# Patient Record
Sex: MALE | NOT HISPANIC OR LATINO | URBAN - METROPOLITAN AREA
[De-identification: names, ages, dates, MRNs, and addresses within clinical notes are randomized per-mention and may not be internally consistent; named-entity substitution may affect disease eponyms.]

---

## 2018-03-28 ENCOUNTER — OFFICE VISIT - HEALTHEAST (OUTPATIENT)
Dept: INTERNAL MEDICINE | Facility: CLINIC | Age: 38
End: 2018-03-28

## 2018-03-28 DIAGNOSIS — R00.2 PALPITATIONS: ICD-10-CM

## 2018-03-28 DIAGNOSIS — R14.3 FLATULENCE: ICD-10-CM

## 2018-03-28 DIAGNOSIS — Z00.00 HEALTH MAINTENANCE EXAMINATION: ICD-10-CM

## 2018-03-28 DIAGNOSIS — K21.9 GASTROESOPHAGEAL REFLUX DISEASE WITHOUT ESOPHAGITIS: ICD-10-CM

## 2018-03-28 DIAGNOSIS — D69.6 THROMBOCYTOPENIA (H): ICD-10-CM

## 2018-03-28 LAB
ALBUMIN SERPL-MCNC: 3.8 G/DL (ref 3.5–5)
ALP SERPL-CCNC: 56 U/L (ref 45–120)
ALT SERPL W P-5'-P-CCNC: 60 U/L (ref 0–45)
ANION GAP SERPL CALCULATED.3IONS-SCNC: 11 MMOL/L (ref 5–18)
AST SERPL W P-5'-P-CCNC: 35 U/L (ref 0–40)
BILIRUB SERPL-MCNC: 0.3 MG/DL (ref 0–1)
BUN SERPL-MCNC: 16 MG/DL (ref 8–22)
CALCIUM SERPL-MCNC: 9.3 MG/DL (ref 8.5–10.5)
CHLORIDE BLD-SCNC: 102 MMOL/L (ref 98–107)
CHOLEST SERPL-MCNC: 157 MG/DL
CO2 SERPL-SCNC: 27 MMOL/L (ref 22–31)
CREAT SERPL-MCNC: 1.08 MG/DL (ref 0.7–1.3)
ERYTHROCYTE [DISTWIDTH] IN BLOOD BY AUTOMATED COUNT: 13.4 % (ref 11–14.5)
FASTING STATUS PATIENT QL REPORTED: NO
GFR SERPL CREATININE-BSD FRML MDRD: >60 ML/MIN/1.73M2
GLUCOSE BLD-MCNC: 81 MG/DL (ref 70–125)
HCT VFR BLD AUTO: 46.7 % (ref 40–54)
HDLC SERPL-MCNC: 46 MG/DL
HGB BLD-MCNC: 15.2 G/DL (ref 14–18)
HIV 1+2 AB+HIV1 P24 AG SERPL QL IA: NEGATIVE
LDLC SERPL CALC-MCNC: 85 MG/DL
MCH RBC QN AUTO: 23.6 PG (ref 27–34)
MCHC RBC AUTO-ENTMCNC: 32.5 G/DL (ref 32–36)
MCV RBC AUTO: 73 FL (ref 80–100)
PLATELET # BLD AUTO: 118 THOU/UL (ref 140–440)
PMV BLD AUTO: 10 FL (ref 7–10)
POTASSIUM BLD-SCNC: 4.1 MMOL/L (ref 3.5–5)
PROT SERPL-MCNC: 7 G/DL (ref 6–8)
RBC # BLD AUTO: 6.44 MILL/UL (ref 4.4–6.2)
SODIUM SERPL-SCNC: 140 MMOL/L (ref 136–145)
TRIGL SERPL-MCNC: 128 MG/DL
WBC: 5 THOU/UL (ref 4–11)

## 2018-03-28 RX ORDER — ACETAMINOPHEN 325 MG/1
650 TABLET ORAL EVERY 6 HOURS PRN
Status: SHIPPED | COMMUNITY
Start: 2018-03-28

## 2018-03-28 ASSESSMENT — MIFFLIN-ST. JEOR: SCORE: 1661.77

## 2018-03-29 LAB
C TRACH DNA SPEC QL PROBE+SIG AMP: NEGATIVE
N GONORRHOEA DNA SPEC QL NAA+PROBE: NEGATIVE
T PALLIDUM AB SER QL: NEGATIVE

## 2018-06-14 ENCOUNTER — COMMUNICATION - HEALTHEAST (OUTPATIENT)
Dept: INTERNAL MEDICINE | Facility: CLINIC | Age: 38
End: 2018-06-14

## 2018-12-12 ENCOUNTER — COMMUNICATION - HEALTHEAST (OUTPATIENT)
Dept: INTERNAL MEDICINE | Facility: CLINIC | Age: 38
End: 2018-12-12

## 2018-12-12 DIAGNOSIS — Z00.00 ROUTINE HEALTH MAINTENANCE: ICD-10-CM

## 2019-02-25 ENCOUNTER — COMMUNICATION - HEALTHEAST (OUTPATIENT)
Dept: INTERNAL MEDICINE | Facility: CLINIC | Age: 39
End: 2019-02-25

## 2019-06-24 ENCOUNTER — OFFICE VISIT - HEALTHEAST (OUTPATIENT)
Dept: INTERNAL MEDICINE | Facility: CLINIC | Age: 39
End: 2019-06-24

## 2019-06-24 DIAGNOSIS — R20.8 BURNING SENSATION OF FOOT: ICD-10-CM

## 2019-06-24 DIAGNOSIS — R10.9 RIGHT LATERAL ABDOMINAL PAIN: ICD-10-CM

## 2019-06-24 DIAGNOSIS — N48.89 PENIS PAIN: ICD-10-CM

## 2019-06-24 LAB
ALBUMIN SERPL-MCNC: 4.3 G/DL (ref 3.5–5)
ALBUMIN UR-MCNC: NEGATIVE MG/DL
ALP SERPL-CCNC: 54 U/L (ref 45–120)
ALT SERPL W P-5'-P-CCNC: 36 U/L (ref 0–45)
ANION GAP SERPL CALCULATED.3IONS-SCNC: 7 MMOL/L (ref 5–18)
APPEARANCE UR: CLEAR
AST SERPL W P-5'-P-CCNC: 23 U/L (ref 0–40)
BILIRUB SERPL-MCNC: 0.4 MG/DL (ref 0–1)
BILIRUB UR QL STRIP: NEGATIVE
BUN SERPL-MCNC: 14 MG/DL (ref 8–22)
CALCIUM SERPL-MCNC: 10 MG/DL (ref 8.5–10.5)
CHLORIDE BLD-SCNC: 104 MMOL/L (ref 98–107)
CO2 SERPL-SCNC: 28 MMOL/L (ref 22–31)
COLOR UR AUTO: YELLOW
CREAT SERPL-MCNC: 0.96 MG/DL (ref 0.7–1.3)
ERYTHROCYTE [DISTWIDTH] IN BLOOD BY AUTOMATED COUNT: 12.3 % (ref 11–14.5)
GFR SERPL CREATININE-BSD FRML MDRD: >60 ML/MIN/1.73M2
GLUCOSE BLD-MCNC: 104 MG/DL (ref 70–125)
GLUCOSE UR STRIP-MCNC: NEGATIVE MG/DL
HCT VFR BLD AUTO: 49 % (ref 40–54)
HGB BLD-MCNC: 15.7 G/DL (ref 14–18)
HGB UR QL STRIP: NEGATIVE
KETONES UR STRIP-MCNC: NEGATIVE MG/DL
LEUKOCYTE ESTERASE UR QL STRIP: NEGATIVE
MCH RBC QN AUTO: 24 PG (ref 27–34)
MCHC RBC AUTO-ENTMCNC: 32 G/DL (ref 32–36)
MCV RBC AUTO: 75 FL (ref 80–100)
NITRATE UR QL: NEGATIVE
PH UR STRIP: 7 [PH] (ref 5–8)
PLATELET # BLD AUTO: 124 THOU/UL (ref 140–440)
PMV BLD AUTO: 9.3 FL (ref 7–10)
POTASSIUM BLD-SCNC: 4.3 MMOL/L (ref 3.5–5)
PROT SERPL-MCNC: 7.3 G/DL (ref 6–8)
RBC # BLD AUTO: 6.54 MILL/UL (ref 4.4–6.2)
SODIUM SERPL-SCNC: 139 MMOL/L (ref 136–145)
SP GR UR STRIP: 1.02 (ref 1–1.03)
TSH SERPL DL<=0.005 MIU/L-ACNC: 1.01 UIU/ML (ref 0.3–5)
UROBILINOGEN UR STRIP-ACNC: NORMAL
WBC: 4.3 THOU/UL (ref 4–11)

## 2019-06-24 ASSESSMENT — MIFFLIN-ST. JEOR: SCORE: 1668.58

## 2019-06-25 LAB
C TRACH DNA SPEC QL PROBE+SIG AMP: NEGATIVE
N GONORRHOEA DNA SPEC QL NAA+PROBE: NEGATIVE

## 2019-07-01 ENCOUNTER — HOSPITAL ENCOUNTER (OUTPATIENT)
Dept: CT IMAGING | Facility: CLINIC | Age: 39
Discharge: HOME | End: 2019-07-01

## 2019-07-01 DIAGNOSIS — R10.9 RIGHT LATERAL ABDOMINAL PAIN: ICD-10-CM

## 2020-03-03 ENCOUNTER — AMBULATORY - HEALTHEAST (OUTPATIENT)
Dept: NURSING | Facility: CLINIC | Age: 40
End: 2020-03-03

## 2020-03-20 ENCOUNTER — COMMUNICATION - HEALTHEAST (OUTPATIENT)
Dept: INTERNAL MEDICINE | Facility: CLINIC | Age: 40
End: 2020-03-20

## 2020-03-20 DIAGNOSIS — R52 PAIN: ICD-10-CM

## 2020-03-21 RX ORDER — IBUPROFEN 600 MG/1
TABLET, FILM COATED ORAL
Qty: 60 TABLET | Refills: 0 | Status: SHIPPED | OUTPATIENT
Start: 2020-03-21

## 2020-03-25 ENCOUNTER — COMMUNICATION - HEALTHEAST (OUTPATIENT)
Dept: INTERNAL MEDICINE | Facility: CLINIC | Age: 40
End: 2020-03-25

## 2020-03-25 ENCOUNTER — OFFICE VISIT - HEALTHEAST (OUTPATIENT)
Dept: INTERNAL MEDICINE | Facility: CLINIC | Age: 40
End: 2020-03-25

## 2020-03-25 DIAGNOSIS — K21.9 GASTROESOPHAGEAL REFLUX DISEASE WITHOUT ESOPHAGITIS: ICD-10-CM

## 2020-05-08 ENCOUNTER — VIRTUAL VISIT (OUTPATIENT)
Dept: FAMILY MEDICINE | Facility: OTHER | Age: 40
End: 2020-05-08

## 2020-05-08 NOTE — PROGRESS NOTES
"Date: 2020 07:31:09  Clinician: Suzy Koehler  Clinician NPI: [id number]  Patient: Howard Crocker  Patient : 1980  Patient Address:  [address], [place] [address]  Patient Phone: [phone]  Visit Protocol: URI  Patient Summary:  Howard is a 39 year old ( : 1980 ) male who initiated a Visit for COVID-19 (Coronavirus) evaluation and screening. When asked the question \"Please sign me up to receive news, health information and promotions. \", Howard responded \"No\".    Howard states his symptoms started gradually 3-4 days ago. After his symptoms started, they improved and then got worse again.   His symptoms consist of rhinitis, a sore throat, a cough, diarrhea, ear pain, and a headache. He is experiencing mild difficulty breathing with activities but can speak normally in full sentences.   Symptom details     Nasal secretions: The color of his mucus is clear.    Cough: Howard coughs a few times an hour and his cough is not more bothersome at night. Phlegm does not come into his throat when he coughs. He believes his cough is caused by post-nasal drip.     Sore throat: Howard reports having moderate throat pain (4-6 on a 10 point pain scale), does not have exudate on his tonsils, and can swallow liquids. The lymph nodes in his neck are not enlarged. A rash has not appeared on the skin since the sore throat started.     Headache: He states the headache is moderate (4-6 on a 10 point pain scale).      Howard denies having fever, myalgias, facial pain or pressure, nasal congestion, vomiting, nausea, teeth pain, ageusia, anosmia, malaise, wheezing, enlarged lymph nodes, and chills. He also denies taking antibiotic medication for the symptoms, having a sinus infection within the past year, and having recent facial or sinus surgery in the past 60 days.   Precipitating events  Howard is not sure if he has been exposed to someone with strep throat. He has recently been exposed to someone with influenza. " Howard has been in close contact with the following high risk individuals: pregnant women.   Pertinent COVID-19 (Coronavirus) information  Howard does not work or volunteer as healthcare worker or a  and does not work or volunteer in a healthcare facility.   He does not live with a healthcare worker.   Howard has not had a close contact with a laboratory-confirmed COVID-19 patient within 14 days of symptom onset. He also has not had a close contact with a suspected COVID-19 patient within 14 days of symptom onset.   Pertinent medical history  Howard does not need a return to work/school note.   Weight: 180 lbs   Howard does not smoke or use smokeless tobacco.   Additional information as reported by the patient (free text): Pain at the back of the neck. Feel like some pressure on the chest.   Weight: 180 lbs    MEDICATIONS: No current medications, ALLERGIES: NKDA  Clinician Response:  Dear Howard,      Dear Howard  Your symptoms show that you may have coronavirus (COVID-19). This illness can cause fever, cough and trouble breathing. Many people get a mild case and get better on their own. Some people can get very sick.  Will I be tested for COVID-19?  Because we have limited testing supplies we are not testing everyone if they are low risk. We are testing if:   You are very ill. For example, you're on chemotherapy, dialysis or home hospice care. (Contact your specialty clinic or program.)   You live in a nursing home or other long-term care facility. (Talk to your nurse manager or medical director.)   You're a health care worker. ([facility] employees Contact our employee health office for testing.)   We are performing limited curbside testing for healthcare/first responders and people with medical problems that put them at increased risk. It does not appear by the [facility] information you submitted that you meet any of these criteria. If there are medical problems that we did not know about, please repeat an  [facility] visit and let us know what medical conditions you have.   How can I protect others?  Without a test, we can't know for sure that you have COVID-19. For safety, it's very important to follow these rules.  First, stay home and away from others (self-isolate) until:   You've had no fever---and no medicine that reduces fever---for 3 full days (72 hours). And...    Your other symptoms have gotten better. For example, your cough or breathing has improved. And...   At least 10 days have passed since your symptoms started.   During this time:   Don't go to work, school or anywhere else.    Stay away from others in your home. No hugging, kissing or shaking hands.   Don't let anyone visit.   Cover your mouth and nose with a mask, tissue or wash cloth to avoid spreading germs.   Wash your hands and face often. Use soap and water.   How can I take care of myself?  1.Take Tylenol (acetaminophen) for fever or pain. If you have liver or kidney problems, ask your family doctor if it's okay to take Tylenol.   Adults can take either:    650 mg (two 325 mg pills) every 4 to 6 hours, or...   1,000 mg (two 500 mg pills) every 8 hours as needed.    Note: Don't take more than 3,000 mg in one day.  For children, check the Tylenol bottle for the right dose. The dose is based on the child's age or weight.   2.If you have other health problems (like cancer, heart failure, an organ transplant or severe kidney disease): Call your specialty clinic if you don't feel better in the next 2 days.  3.Know when to call 911: If your breathing is so bad that it keeps you from doing normal activities, call 911 or go to the emergency room. Tell them that you've been staying home and may have COVID-19.  4.Sign up for [employer]. We know it's scary to hear that you might have COVID-19. We want to track your symptoms to make sure you're okay over the next 2 weeks. Please look for an email from [employer]---this is a free, online program that we'll  use to keep in touch. To sign up, follow the link in the email. Learn more at [email or web address].  Where can I get more information?  To learn more about COVID-19 and how to care for yourself at home, please visit the CDC website at https://www.cdc.gov/coronavirus/2019-ncov/about/steps-when-sick.html.  For more options for care at [facility], please visit our website at [email or web address]   If you are interested in becoming part of a N clinic trial related to COVID19 please go to [email or web address] for information, if you qualify.     Diagnosis: Chronic pharyngitis  Diagnosis ICD: J31.2

## 2021-05-29 NOTE — PROGRESS NOTES
[facility] Clinic Note    Patient Name: Howard Crocker  Patient Age: 39 y.o.  YOB: 1980  MRN: [id number]    Date of visit: 6/24/2019    Assessment/Plan:  No results found for this or any previous visit (from the past 24 hour(s)).  No medications were ordered this encounter      ICD-10-CM    1. Right lateral abdominal pain R10.9 CT Abdomen Pelvis With Oral With IV Contrast   2. Penis pain N48.89 Comprehensive Metabolic Panel     HM2(CBC w/o Differential)     Chlamydia trachomatis & Neisseria gonorrhoeae, Amplified Detection     Urinalysis-UC if Indicated   3. Burning sensation of foot R20.8 Thyroid Stimulating Hormone (TSH)       Uncertain cause of penile pain, will check a urinalysis and gonorrhea chlamydia, if still having this we will have him see a urologist.    Uncertain cause of heat sensation in feet, will check a thyroid, I did recommend that if it becomes painful or he starts having decreased sensation that he let us know I would look into doing an EMG or testing for neuropathy at that point.    Regarding the abdominal pain I am uncertain the cause it could be just muscular but pain was actually improved with flexion of the abdominal musculature therefore I will get a CT scan.    Patient Instructions   Go to ER if acute worsening.      Counseled patient regarding treatments, treatment options, risks and benefits and diagnosis.  The patient was interactive, attentive, verbalized understanding, and we discussed plan.       Patient Active Problem List   Diagnosis     GERD (gastroesophageal reflux disease)     Thrombocytopenia (H)     Flatulence     Palpitations     Social History     Social History Narrative     Not on file     Family History   Problem Relation Age of Onset     No Medical Problems Mother      No Medical Problems Father      Outpatient Encounter Medications as of 6/24/2019   Medication Sig Dispense Refill     acetaminophen (TYLENOL) 325 MG tablet Take 650 mg by mouth every 6 (six)  "hours as needed for pain.       ibuprofen (ADVIL,MOTRIN) 600 MG tablet TAKE ONE TABLET BY MOUTH THREE TIMES DAILY AS NEEDED FOR PAIN (TAKE WITH FOOD) 60 tablet 0     ranitidine (ZANTAC) 150 MG tablet Take 1 tablet (150 mg total) by mouth 2 (two) times a day as needed for heartburn. 60 tablet 1     No facility-administered encounter medications on file as of 6/24/2019.        Chief Complaint:   Chief Complaint   Patient presents with     Annual Exam     Foot Pain     bilateral     Flank Pain     right side       /72 (Patient Site: Left Arm, Patient Position: Sitting, Cuff Size: Adult Regular)   Pulse 70   Ht 5' 8\" (1.727 m)   Wt 175 lb 1 oz (79.4 kg)   SpO2 99%   BMI 26.62 kg/m    HPI:   Has had foot warm feeling - no burning or pain, entire foot feels that way, intermittent.  Better if he takes his shoes off.  Worse with eating sugar.  No numbness, no injuries    Right side of abdomen and back also has pain.  2 months.  No injury.  Intermittent.  No movement makes it worse.  Stools daily, no hematochezia.  No fever.      Has pain in his penis often.  Urinating more often now than previous.  3-4 lifetime sexual partners, monogamous wife.  Has had this pain intermittently x 3-4 years, more frequent lately.  Has pain postcoital.  No burning with urination.  No difficulty urinating.      ROS: Pertinent ros findings in hpi, all other systems negative.    Objective/Physical Exam:     /72 (Patient Site: Left Arm, Patient Position: Sitting, Cuff Size: Adult Regular)   Pulse 70   Ht 5' 8\" (1.727 m)   Wt 175 lb 1 oz (79.4 kg)   SpO2 99%   BMI 26.62 kg/m      Gen: NAD, appears age  Skin: warm, dry  HENT: normocephalic atraumatic, MMM  Eyes: non-icteric, no proptosis  CV: NRRR no m/r/g, no peripheral edema  Resp: CTAB no w/r/r, normal respiratory effort  Abd: non-distended, soft  Hematologic: No petechiae or purpura  MSK: no muscle or joint swelling  Neuro: no dysarthria or gross asymmetry  Psych: " Cooperative, full affect    tender to palpation:right lateral abdomen, better with abd muscle flexion  soft:Yes  distended:no  hernia palpated: no  hepato-splenomegaly:no  masses: no    Davila's positive: no    ecchymoses:no  flank tenderness: no  suprapubic tenderness:no  >3cm pulsating mass: no    Exam limited by body habitus: no     dp 2+ bilaterally  Sensory exam of the foot is normal, tested with the monofilament. Good pulses, no lesions or ulcers, good peripheral pulses.    Feet are nontender palpation, no swelling.  No excessive warmth on palpation.    Normal male external genitalia.  Prostate is also normal on exam.  Nontender.          Shon Braden MD

## 2021-06-01 VITALS — HEIGHT: 68 IN | BODY MASS INDEX: 26.31 KG/M2 | WEIGHT: 173.56 LBS

## 2021-06-03 VITALS — WEIGHT: 175.06 LBS | HEIGHT: 68 IN | BODY MASS INDEX: 26.53 KG/M2

## 2021-06-07 NOTE — TELEPHONE ENCOUNTER
RN cannot approve Refill Request    RN can NOT refill this medication med is not covered by policy/route to provider     . Last office visit: Visit date not found Last Physical: 3/28/2018 Last MTM visit: Visit date not found Last visit same specialty: Visit date not found.  Next visit within 3 mo: Visit date not found  Next physical within 3 mo: Visit date not found      Libertad Kendrick, [facility] Triage/Med Refill 3/20/2020    Requested Prescriptions   Pending Prescriptions Disp Refills     ibuprofen (ADVIL,MOTRIN) 600 MG tablet 60 tablet 0     Sig: TAKE ONE TABLET BY MOUTH THREE TIMES DAILY AS NEEDED FOR PAIN (TAKE WITH FOOD)       There is no refill protocol information for this order

## 2021-06-07 NOTE — PROGRESS NOTES
Howard Crocker is a 39 y.o. male who is being evaluated via a billable telephone visit.      Howard Crocker complains of    Chief Complaint   Patient presents with     Sore Throat     Chest Pain       Assessment/Plan:  1. Gastroesophageal reflux disease without esophagitis  Howard Crocker has a constellation of symptoms that match closely with GERD.  I recommended avoidance of ibuprofen, which he has been taking intermittently for headache, that he avoid eating right before sleep, avoid eating too much, and he should try omeprazole 20 mg daily for 4-week trial..  He will let me know in a couple weeks if is not working.  His wife has similar symptoms.  Initially I thought that this could be something that was passed from 1 person to another, but he also mentions she is pregnant, which could explain acid reflux.  I also considered strep throat, but he is afebrile, does not have any tender adenopathy, and he has not noticed any abnormality when inspecting his throat.  He does not notice any posterior nasal drainage, so less likely from that.        Additional provider notes:     Howard is a 39-year-old man who presents for telephone visit related to symptoms of sore throat.    He has been experiencing the 2nd week of sore throat that comes and goes, mild in severity.  Her wife is having the same symptoms (she is pregnant).    His mouth has a bitter taste in the monring, sometimes during the day at work    He sometimes has more classic heartburn symptoms.  He does have a history of heartburn and used to take ranitidine while it was still available.    He feels there is something stuck in the chest.  He is not short winded.  This is a very mild symptom, but somewhat annoying.    There is no cough.  1-2 months ago he had a cough    He uses ibuprofen for headache, which comes and goes.  I had just refilled a 600 mg tablet of ibuprofen for him, though he has not picked this up yet.    No fever.      No runny nose or posterior nasal  "drainage.      If something is hot or spicy his nose might run, but this is not the norm.    I have reviewed and updated the patient's Past Medical History, Social History, Family History, Allergies and Medication List.      The patient has been notified of following:     \"This telephone visit will be conducted via a call between you and your physician/provider. We have found that certain health care needs can be provided without the need for a physical exam.  This service lets us provide the care you need with a short phone conversation.  If a prescription is necessary we can send it directly to your pharmacy.  If lab work is needed we can place an order for that and you can then stop by our lab to have the test done at a later time.    If during the course of the call the physician/provider feels a telephone visit is not appropriate, you will not be charged for this service.\"       Phone call duration:  12 minutes    "

## 2021-06-07 NOTE — TELEPHONE ENCOUNTER
New Appointment Needed  What is the reason for the visit:    Sore throat, everything tastes bitter, feeling like something is pressing on chest, but breathing well and eating well.  Provider Preference: Any available  How soon do you need to be seen?: as soon as available  Waitlist offered?: No  Okay to leave a detailed message:  Yes

## 2021-06-07 NOTE — PATIENT INSTRUCTIONS - HE
The symptoms you presented (sore throat which seems to come and go), bitter taste in the throat, occasional heartburn, and the history of taking ibuprofen all seem to fit best with acid reflux or stomach acid as the cause of all this.    In general, I would recommend avoiding eating too much (full stomach can make symptoms worse), avoid eating just before you lie down for sleep, and would recommend taking omeprazole 20 mg daily.  It is best taken 30 to 60 minutes before your first meal.    Let me know if symptoms do not improve.  The omeprazole does not need to be a long-term medication, but I would recommend a 4-week trial.  That way we can tell if it will help or not.    You mentioned that your wife is pregnant and has similar symptoms.  I would not recommend that she takes omeprazole, as it is not regarded as one of the safer medications to take while pregnant.

## 2021-06-16 PROBLEM — R14.3 FLATULENCE: Status: ACTIVE | Noted: 2018-03-28

## 2021-06-16 PROBLEM — R00.2 PALPITATIONS: Status: ACTIVE | Noted: 2018-03-28

## 2021-06-16 PROBLEM — K21.9 GERD (GASTROESOPHAGEAL REFLUX DISEASE): Status: ACTIVE | Noted: 2018-03-28

## 2021-06-16 PROBLEM — D69.6 THROMBOCYTOPENIA (H): Status: ACTIVE | Noted: 2018-03-28

## 2021-06-17 NOTE — PROGRESS NOTES
[facility] Note    Howard Crocker   37 y.o. male    Date of Visit: 3/28/2018  Chief Complaint   Patient presents with     Annual Exam     Abdominal Pain     x6 months       ASSESSMENT/PLAN  1. Health maintenance examination  HM2(CBC w/o Differential)    Comprehensive Metabolic Panel    Lipid Cascade    HIV Antigen/Antibody Screening [place]    Syphilis Screen, Cascade    Chlamydia trachomatis & Neisseria gonorrhoeae, Amplified Detection   2. Gastroesophageal reflux disease without esophagitis  ranitidine (ZANTAC) 150 MG tablet   3. Thrombocytopenia  HM2(CBC w/o Differential)   4. Flatulence     5. Palpitations       ---------------------------------------------    1.  Check nonfasting labs as requested today.  He denies any tobacco or alcohol use or illicit drugs.  Overall, he seems in very good shape    2.  Rare episodes of GERD, recommended ranitidine, avoidance of spicy foods as this seems to be a trigger    3.  Noted history of thrombocytopenia, microcytosis, he also had workup for alpha thalassemia trait.  He had negative ultrasound of the spleen (it was not enlarged as could be the case for ITP), iron levels normal.  I could not see that the hemoglobin electrophoresis was ever actually done    4.  Increased flatulence, difficult to say what this is, likely an undigested carbohydrate in the form of lactose intolerance, increased fiber consumption by bacteria in the colon resulting in increased gas.  I forgot to ask about chewing gum or drinking carbonated beverages.  He did not have any red flags such as blood in the stool, diarrhea, etc.  I asked him to fill out a food diary to help determine what is going on.    5.  Cardiac exam was normal today, was not sure what to make of palpitations, but they are also not associated with red flags such as syncope or lightheadedness.    Return if symptoms worsen or fail to improve, for Annual physical.      SUBJECTIVE  Howard Crocker is a 37-year-old man from [facility]  who presents to establish care and have a annual physical.    He also has been having an increased amount of flatulence, which seems to have increased since his umbilical hernia surgery April 2017.  He does not have any associated abdominal pain, diarrhea, or melena/hematochezia, and not clear what causes the increased flatulence.  This is his main concern.  It does not sound like he is consuming dairy or milk.  He tried Gas-X, but it did not help.  He is not having any gas related pain.    He also has what he describes as heartburn (his exact words are heart wound), worse when eating spicy food.  This happens only once or twice per month.  He used to be on a prescription for ranitidine, but returned home to visit, and lost the prescription.  He denies any trouble swallowing.  He notes that sometimes his stomach makes a lot of noise and is churning.    Overall, he has been healthy, does not use any tobacco or alcohol.  Only medical problem in the past was the umbilical hernia.    He also mentioned that he sometimes has a fast heart rate, but is not associated with any dizziness.  It goes away without incident.    He mentions some increased frequency of urination.  Instead of 4-5 times per day, it is 5-6 times per day.    He is , does not have children, does janitorial work and works as a .    The entirety of his family is healthy with the exception of his maternal uncle.    ROS A comprehensive review of systems was performed and was otherwise negative    Medications, allergies, and problem list were reviewed and updated    Patient Active Problem List   Diagnosis     GERD (gastroesophageal reflux disease)     Thrombocytopenia     Flatulence     Palpitations     History reviewed. No pertinent past medical history.  Past Surgical History:   Procedure Laterality Date     UMBILICAL HERNIA REPAIR  04/2017     Social History     Social History     Marital status:      Spouse name: N/A     Number of  "children: N/A     Years of education: N/A     Occupational History     Not on file.     Social History Main Topics     Smoking status: Never Smoker     Smokeless tobacco: Never Used     Alcohol use No     Drug use: Not on file     Sexual activity: Not on file     Other Topics Concern     Not on file     Social History Narrative     No narrative on file     Family History   Problem Relation Age of Onset     No Medical Problems Mother      No Medical Problems Father        Current Outpatient Prescriptions   Medication Sig Dispense Refill     acetaminophen (TYLENOL) 325 MG tablet Take 650 mg by mouth every 6 (six) hours as needed for pain.       ranitidine (ZANTAC) 150 MG tablet Take 1 tablet (150 mg total) by mouth 2 (two) times a day as needed for heartburn. 60 tablet 1     No current facility-administered medications for this visit.        No Known Allergies    EXAM  Vitals:    03/28/18 1442   BP: 110/68   Patient Site: Left Arm   Patient Position: Sitting   Cuff Size: Adult Regular   Pulse: 76   SpO2: 98%   Weight: 173 lb 9 oz (78.7 kg)   Height: 5' 8\" (1.727 m)         General: alert, no distress  HEENT: sclerae anicteric, moist oral mucosa  Heart: Regular rate and rhythm, no murmurs.  No pretibial edema.  Warm extremities  Lungs: Clear to auscultation bilat  Gastrointestinal: abdomen is soft, non-tender, non-distended.    Skin: warm/dry, no rashes  Neuro: no gross abnormalities      RESULTS REVIEWED:     ANALYSIS AND SUMMARY OF OLD RECORDS, NOTES AND CONSULTS (2): Reviewed note on 5/4/17 about postop exam after umbilical hernia repair, was doing well at that point    RECORDS REQUESTED (1): Care everywhere release obtained.       Labs ordered today 3    Data points  3     Jerel Trent,   Internal Medicine  [facility]  "

## 2021-06-24 NOTE — TELEPHONE ENCOUNTER
RN cannot approve Refill Request    RN can NOT refill this medication med is not covered by policy/route to provider.    Dez Javed, [facility] Triage/Med Refill 2/26/2019    Requested Prescriptions   Pending Prescriptions Disp Refills     ibuprofen (ADVIL,MOTRIN) 600 MG tablet 60 tablet 0     Sig: TAKE ONE TABLET BY MOUTH THREE TIMES DAILY AS NEEDED FOR PAIN (TAKE WITH FOOD)    There is no refill protocol information for this order

## 2021-06-27 ENCOUNTER — HEALTH MAINTENANCE LETTER (OUTPATIENT)
Age: 41
End: 2021-06-27

## 2021-07-03 NOTE — ADDENDUM NOTE
Addendum Note by Breann Braden MD at 6/24/2019  7:45 AM     Author: Breann Braden MD Service: -- Author Type: Physician    Filed: 6/26/2019  7:25 AM Encounter Date: 6/24/2019 Status: Signed    : Breann Braden MD (Physician)    Addended by: BREANN BRADEN on: 6/26/2019 07:25 AM        Modules accepted: Orders

## 2021-07-03 NOTE — ADDENDUM NOTE
Addendum Note by Elsa Trent DO at 3/25/2020  2:05 PM     Author: Elsa Trent DO Service: -- Author Type: Physician    Filed: 3/25/2020  2:25 PM Encounter Date: 3/25/2020 Status: Signed    : Elsa Trent DO (Physician)    Addended by: ELSA TRENT on: 3/25/2020 02:25 PM        Modules accepted: Orders

## 2021-10-17 ENCOUNTER — HEALTH MAINTENANCE LETTER (OUTPATIENT)
Age: 41
End: 2021-10-17

## 2022-07-24 ENCOUNTER — HEALTH MAINTENANCE LETTER (OUTPATIENT)
Age: 42
End: 2022-07-24

## 2022-10-02 ENCOUNTER — HEALTH MAINTENANCE LETTER (OUTPATIENT)
Age: 42
End: 2022-10-02

## 2023-08-12 ENCOUNTER — HEALTH MAINTENANCE LETTER (OUTPATIENT)
Age: 43
End: 2023-08-12